# Patient Record
Sex: MALE | Race: WHITE | HISPANIC OR LATINO | URBAN - METROPOLITAN AREA
[De-identification: names, ages, dates, MRNs, and addresses within clinical notes are randomized per-mention and may not be internally consistent; named-entity substitution may affect disease eponyms.]

---

## 2018-01-18 ENCOUNTER — NEW PATIENT (OUTPATIENT)
Dept: URBAN - METROPOLITAN AREA CLINIC 85 | Facility: CLINIC | Age: 63
End: 2018-01-18
Payer: COMMERCIAL

## 2018-01-18 PROCEDURE — 92004 COMPRE OPH EXAM NEW PT 1/>: CPT | Performed by: OPHTHALMOLOGY

## 2018-01-18 RX ORDER — OFLOXACIN 3 MG/ML
0.3 % SOLUTION/ DROPS OPHTHALMIC
Qty: 1 | Refills: 1 | Status: ACTIVE
Start: 2018-01-18

## 2018-01-18 RX ORDER — PREDNISOLONE ACETATE 10 MG/ML
1 % SUSPENSION/ DROPS OPHTHALMIC
Qty: 1 | Refills: 1 | Status: ACTIVE
Start: 2018-01-18

## 2018-01-18 ASSESSMENT — KERATOMETRY
OD: 44.25
OS: 44.38

## 2018-01-18 ASSESSMENT — INTRAOCULAR PRESSURE
OD: 12
OS: 13

## 2018-01-18 ASSESSMENT — VISUAL ACUITY
OD: 20/50
OS: 20/50

## 2018-01-23 ENCOUNTER — Encounter (OUTPATIENT)
Dept: URBAN - METROPOLITAN AREA CLINIC 85 | Facility: CLINIC | Age: 63
End: 2018-01-23
Payer: COMMERCIAL

## 2018-01-23 DIAGNOSIS — H25.813 COMBINED FORMS OF AGE-RELATED CATARACT, BILATERAL: ICD-10-CM

## 2018-01-23 DIAGNOSIS — Z01.818 ENCOUNTER FOR OTHER PREPROCEDURAL EXAMINATION: Primary | ICD-10-CM

## 2018-01-23 PROCEDURE — 99213 OFFICE O/P EST LOW 20 MIN: CPT | Performed by: NURSE PRACTITIONER

## 2018-01-24 ENCOUNTER — SURGERY (OUTPATIENT)
Dept: URBAN - METROPOLITAN AREA SURGERY 55 | Facility: SURGERY | Age: 63
End: 2018-01-24
Payer: COMMERCIAL

## 2018-01-24 ENCOUNTER — POST OP (OUTPATIENT)
Dept: URBAN - METROPOLITAN AREA CLINIC 85 | Facility: CLINIC | Age: 63
End: 2018-01-24

## 2018-01-24 PROCEDURE — 99024 POSTOP FOLLOW-UP VISIT: CPT | Performed by: OPHTHALMOLOGY

## 2018-01-24 PROCEDURE — 66984 XCAPSL CTRC RMVL W/O ECP: CPT | Performed by: OPHTHALMOLOGY

## 2018-01-24 ASSESSMENT — INTRAOCULAR PRESSURE
OD: 19
OD: 19

## 2018-02-05 ENCOUNTER — POST OP (OUTPATIENT)
Dept: URBAN - METROPOLITAN AREA CLINIC 85 | Facility: CLINIC | Age: 63
End: 2018-02-05

## 2018-02-05 DIAGNOSIS — Z96.1 PRESENCE OF INTRAOCULAR LENS: Primary | ICD-10-CM

## 2018-02-05 PROCEDURE — 99024 POSTOP FOLLOW-UP VISIT: CPT | Performed by: OPTOMETRIST

## 2018-02-05 ASSESSMENT — VISUAL ACUITY
OS: 20/50
OD: 20/20

## 2018-02-05 ASSESSMENT — INTRAOCULAR PRESSURE
OD: 14
OS: 14

## 2018-02-07 ENCOUNTER — SURGERY (OUTPATIENT)
Dept: URBAN - METROPOLITAN AREA SURGERY 55 | Facility: SURGERY | Age: 63
End: 2018-02-07
Payer: COMMERCIAL

## 2018-02-07 ENCOUNTER — POST OP (OUTPATIENT)
Dept: URBAN - METROPOLITAN AREA CLINIC 85 | Facility: CLINIC | Age: 63
End: 2018-02-07

## 2018-02-07 PROCEDURE — 99024 POSTOP FOLLOW-UP VISIT: CPT | Performed by: OPHTHALMOLOGY

## 2018-02-07 PROCEDURE — 66984 XCAPSL CTRC RMVL W/O ECP: CPT | Performed by: OPHTHALMOLOGY

## 2018-02-07 ASSESSMENT — INTRAOCULAR PRESSURE
OS: 9
OS: 9

## 2018-03-01 ENCOUNTER — POST OP (OUTPATIENT)
Dept: URBAN - METROPOLITAN AREA CLINIC 85 | Facility: CLINIC | Age: 63
End: 2018-03-01

## 2018-03-01 PROCEDURE — 99024 POSTOP FOLLOW-UP VISIT: CPT | Performed by: OPTOMETRIST

## 2018-03-01 ASSESSMENT — INTRAOCULAR PRESSURE
OD: 14
OS: 14

## 2018-03-01 ASSESSMENT — VISUAL ACUITY
OS: 20/20
OD: 20/20

## 2022-01-10 ENCOUNTER — APPOINTMENT (RX ONLY)
Dept: URBAN - METROPOLITAN AREA CLINIC 67 | Facility: CLINIC | Age: 67
Setting detail: DERMATOLOGY
End: 2022-01-10

## 2022-01-10 DIAGNOSIS — I78.8 OTHER DISEASES OF CAPILLARIES: ICD-10-CM

## 2022-01-10 DIAGNOSIS — D49.2 NEOPLASM OF UNSPECIFIED BEHAVIOR OF BONE, SOFT TISSUE, AND SKIN: ICD-10-CM

## 2022-01-10 DIAGNOSIS — D69.2 OTHER NONTHROMBOCYTOPENIC PURPURA: ICD-10-CM

## 2022-01-10 PROCEDURE — ? DEFER

## 2022-01-10 PROCEDURE — ? COUNSELING

## 2022-01-10 PROCEDURE — 99203 OFFICE O/P NEW LOW 30 MIN: CPT

## 2022-01-10 ASSESSMENT — LOCATION ZONE DERM
LOCATION ZONE: HAND
LOCATION ZONE: HAND
LOCATION ZONE: ARM
LOCATION ZONE: FACE
LOCATION ZONE: ARM

## 2022-01-10 ASSESSMENT — LOCATION DETAILED DESCRIPTION DERM
LOCATION DETAILED: RIGHT PROXIMAL DORSAL FOREARM
LOCATION DETAILED: RIGHT PROXIMAL DORSAL FOREARM
LOCATION DETAILED: RIGHT MEDIAL MALAR CHEEK
LOCATION DETAILED: LEFT RADIAL DORSAL HAND
LOCATION DETAILED: LEFT RADIAL DORSAL HAND

## 2022-01-10 ASSESSMENT — LOCATION SIMPLE DESCRIPTION DERM
LOCATION SIMPLE: LEFT HAND
LOCATION SIMPLE: LEFT HAND
LOCATION SIMPLE: RIGHT FOREARM
LOCATION SIMPLE: RIGHT CHEEK
LOCATION SIMPLE: RIGHT FOREARM

## 2022-01-10 NOTE — PROCEDURE: MIPS QUALITY
Quality 402: Tobacco Use And Help With Quitting Among Adolescents: Patient screened for tobacco and never smoked
Quality 431: Preventive Care And Screening: Unhealthy Alcohol Use - Screening: Patient not identified as an unhealthy alcohol user when screened for unhealthy alcohol use using a systematic screening method
Detail Level: Detailed
Quality 110: Preventive Care And Screening: Influenza Immunization: Influenza immunization was not ordered or administered, reason not given
Quality 47: Advance Care Plan: Advance Care Planning discussed and documented; advance care plan or surrogate decision maker documented in the medical record.
Quality 111:Pneumonia Vaccination Status For Older Adults: Pneumococcal Vaccination not Administered or Previously Received, Reason not Otherwise Specified

## 2022-01-10 NOTE — PROCEDURE: DEFER
Introduction Text (Please End With A Colon): The following procedure was deferred: biopsy
Instructions (Optional): Left medial Malar cheek 0.7 x 0.5 r/o: Lentigo vs Lentigo Maligna vs AK
Procedure To Be Performed At Next Visit: Biopsy by shave method
Detail Level: Zone

## 2022-01-24 ENCOUNTER — APPOINTMENT (RX ONLY)
Dept: URBAN - METROPOLITAN AREA CLINIC 67 | Facility: CLINIC | Age: 67
Setting detail: DERMATOLOGY
End: 2022-01-24

## 2022-01-24 DIAGNOSIS — D49.2 NEOPLASM OF UNSPECIFIED BEHAVIOR OF BONE, SOFT TISSUE, AND SKIN: ICD-10-CM

## 2022-01-24 DIAGNOSIS — L28.0 LICHEN SIMPLEX CHRONICUS: ICD-10-CM | Status: INADEQUATELY CONTROLLED

## 2022-01-24 PROCEDURE — ? SHAVE REMOVAL

## 2022-01-24 PROCEDURE — ? COUNSELING

## 2022-01-24 PROCEDURE — 99214 OFFICE O/P EST MOD 30 MIN: CPT | Mod: 25

## 2022-01-24 PROCEDURE — 11311 SHAVE SKIN LESION 0.6-1.0 CM: CPT

## 2022-01-24 PROCEDURE — ? PRESCRIPTION

## 2022-01-24 RX ORDER — AMMONIUM LACTATE 120 MG/G
CREAM TOPICAL
Qty: 385 | Refills: 0 | Status: ERX | COMMUNITY
Start: 2022-01-24

## 2022-01-24 RX ADMIN — AMMONIUM LACTATE: 120 CREAM TOPICAL at 00:00

## 2022-01-24 ASSESSMENT — LOCATION SIMPLE DESCRIPTION DERM
LOCATION SIMPLE: RIGHT WRIST
LOCATION SIMPLE: LEFT CHEEK

## 2022-01-24 ASSESSMENT — LOCATION DETAILED DESCRIPTION DERM
LOCATION DETAILED: LEFT MEDIAL MALAR CHEEK
LOCATION DETAILED: RIGHT DORSAL WRIST

## 2022-01-24 ASSESSMENT — LOCATION ZONE DERM
LOCATION ZONE: ARM
LOCATION ZONE: FACE

## 2022-01-24 NOTE — PROCEDURE: SHAVE REMOVAL
Medical Necessity Information: It is in your best interest to select a reason for this procedure from the list below. All of these items fulfill various CMS LCD requirements except the new and changing color options.
Medical Necessity Clause: This procedure was medically necessary because the lesion that was treated was:
Lab: [id number] [facility]
Detail Level: Detailed
Was A Bandage Applied: Yes
Size Of Lesion In Cm (Required): 0.7
X Size Of Lesion In Cm (Optional): 0.5
Biopsy Method: Personna blade
Anesthesia Type: 1% lidocaine with epinephrine
Hemostasis: Drysol
Wound Care: Petrolatum
Path Notes (To The Dermatopathologist): Left medial malar cheek \\n0.7cm x 0.5cm\\nR/O: Lentigo vs. LM vs. AK
Render Path Notes In Note?: No
Consent was obtained from the patient. The risks and benefits to therapy were discussed in detail. Specifically, the risks of infection, scarring, bleeding, prolonged wound healing, incomplete removal, allergy to anesthesia, nerve injury and recurrence were addressed. Prior to the procedure, the treatment site was clearly identified and confirmed by the patient. All components of Universal Protocol/PAUSE Rule completed.
Post-Care Instructions: I reviewed with the patient in detail post-care instructions. Patient is to keep the biopsy site dry overnight, and then apply bacitracin twice daily until healed. Patient may apply hydrogen peroxide soaks to remove any crusting.
Notification Instructions: Patient will be notified of pathology results. However, patient instructed to call the office if not contacted within 2 weeks.
Billing Type: [employer]

## 2022-03-09 ENCOUNTER — APPOINTMENT (RX ONLY)
Dept: URBAN - METROPOLITAN AREA CLINIC 67 | Facility: CLINIC | Age: 67
Setting detail: DERMATOLOGY
End: 2022-03-09

## 2022-03-09 DIAGNOSIS — L28.0 LICHEN SIMPLEX CHRONICUS: ICD-10-CM

## 2022-03-09 DIAGNOSIS — L82.1 OTHER SEBORRHEIC KERATOSIS: ICD-10-CM

## 2022-03-09 DIAGNOSIS — L81.4 OTHER MELANIN HYPERPIGMENTATION: ICD-10-CM

## 2022-03-09 PROCEDURE — 99214 OFFICE O/P EST MOD 30 MIN: CPT

## 2022-03-09 PROCEDURE — ? TREATMENT REGIMEN

## 2022-03-09 PROCEDURE — ? COUNSELING

## 2022-03-09 PROCEDURE — ? PATHOLOGY DISCUSSION

## 2022-03-09 PROCEDURE — ? PRESCRIPTION

## 2022-03-09 RX ORDER — TRIAMCINOLONE ACETONIDE 1 MG/G
CREAM TOPICAL BID
Qty: 80 | Refills: 0 | Status: ERX | COMMUNITY
Start: 2022-03-09

## 2022-03-09 RX ADMIN — TRIAMCINOLONE ACETONIDE: 1 CREAM TOPICAL at 00:00

## 2022-03-09 ASSESSMENT — LOCATION ZONE DERM
LOCATION ZONE: ARM
LOCATION ZONE: HAND
LOCATION ZONE: FACE
LOCATION ZONE: FACE
LOCATION ZONE: ARM

## 2022-03-09 ASSESSMENT — LOCATION SIMPLE DESCRIPTION DERM
LOCATION SIMPLE: RIGHT WRIST
LOCATION SIMPLE: RIGHT HAND
LOCATION SIMPLE: LEFT CHEEK
LOCATION SIMPLE: LEFT ELBOW
LOCATION SIMPLE: LEFT ELBOW
LOCATION SIMPLE: LEFT CHEEK

## 2022-03-09 ASSESSMENT — LOCATION DETAILED DESCRIPTION DERM
LOCATION DETAILED: LEFT ANTECUBITAL SKIN
LOCATION DETAILED: LEFT ANTECUBITAL SKIN
LOCATION DETAILED: LEFT SUPERIOR CENTRAL MALAR CHEEK
LOCATION DETAILED: RIGHT RADIAL DORSAL HAND
LOCATION DETAILED: RIGHT DORSAL WRIST
LOCATION DETAILED: RIGHT ULNAR DORSAL HAND
LOCATION DETAILED: LEFT CENTRAL MALAR CHEEK

## 2025-05-22 ENCOUNTER — APPOINTMENT (OUTPATIENT)
Dept: URBAN - METROPOLITAN AREA CLINIC 67 | Facility: CLINIC | Age: 70
Setting detail: DERMATOLOGY
End: 2025-05-22

## 2025-05-22 DIAGNOSIS — D49.2 NEOPLASM OF UNSPECIFIED BEHAVIOR OF BONE, SOFT TISSUE, AND SKIN: ICD-10-CM

## 2025-05-22 DIAGNOSIS — D18.0 HEMANGIOMA: ICD-10-CM

## 2025-05-22 DIAGNOSIS — L90.5 SCAR CONDITIONS AND FIBROSIS OF SKIN: ICD-10-CM

## 2025-05-22 PROBLEM — D18.01 HEMANGIOMA OF SKIN AND SUBCUTANEOUS TISSUE: Status: ACTIVE | Noted: 2025-05-22

## 2025-05-22 PROCEDURE — ? DEFER

## 2025-05-22 PROCEDURE — 99213 OFFICE O/P EST LOW 20 MIN: CPT

## 2025-05-22 PROCEDURE — ? COUNSELING

## 2025-05-22 ASSESSMENT — LOCATION DETAILED DESCRIPTION DERM
LOCATION DETAILED: LEFT MEDIAL SUPERIOR CHEST
LOCATION DETAILED: RIGHT MEDIAL INFERIOR CHEST
LOCATION DETAILED: LEFT MEDIAL INFERIOR CHEST
LOCATION DETAILED: LEFT INFERIOR HELIX
LOCATION DETAILED: RIGHT PROXIMAL DORSAL FOREARM

## 2025-05-22 ASSESSMENT — LOCATION ZONE DERM
LOCATION ZONE: ARM
LOCATION ZONE: EAR
LOCATION ZONE: TRUNK

## 2025-05-22 ASSESSMENT — LOCATION SIMPLE DESCRIPTION DERM
LOCATION SIMPLE: RIGHT FOREARM
LOCATION SIMPLE: CHEST
LOCATION SIMPLE: LEFT EAR

## 2025-05-22 NOTE — PROCEDURE: MIPS QUALITY
Quality 226: Preventive Care And Screening: Tobacco Use: Screening And Cessation Intervention: Patient screened for tobacco use and is an ex/non-smoker
Detail Level: Detailed
Quality 130: Documentation Of Current Medications In The Medical Record: Current Medications Documented
Quality 431: Preventive Care And Screening: Unhealthy Alcohol Use - Screening: Patient not identified as an unhealthy alcohol user when screened for unhealthy alcohol use using a systematic screening method
Quality 47: Advance Care Plan: Advance care planning not documented, reason not otherwise specified.
Quality 486: Dermatitis - Improvement In Patient-Reported Itch Severity: Itch severity assessment score is reduced by 3 or more points from the initial (index) assessment score to the follow-up visit score

## 2025-05-22 NOTE — PROCEDURE: DEFER
Detail Level: Detailed
X Size Of Lesion In Cm (Optional): 0
Introduction Text (Please End With A Colon): The following procedure was deferred: biopsy
Procedure To Be Performed At Next Visit: Biopsy by shave method
Instructions (Optional): A. 1.2cm BCC vs SCC\\nB. 0.7cm SL vs Mm\\nC. 0.7cm BCC vs SCC

## 2025-06-05 ENCOUNTER — APPOINTMENT (OUTPATIENT)
Dept: URBAN - METROPOLITAN AREA CLINIC 67 | Facility: CLINIC | Age: 70
Setting detail: DERMATOLOGY
End: 2025-06-05

## 2025-06-05 DIAGNOSIS — D49.2 NEOPLASM OF UNSPECIFIED BEHAVIOR OF BONE, SOFT TISSUE, AND SKIN: ICD-10-CM

## 2025-06-05 PROCEDURE — ? BIOPSY BY SHAVE METHOD

## 2025-06-05 PROCEDURE — ? COUNSELING

## 2025-06-05 ASSESSMENT — LOCATION ZONE DERM
LOCATION ZONE: TRUNK
LOCATION ZONE: ARM
LOCATION ZONE: EAR

## 2025-06-05 ASSESSMENT — LOCATION DETAILED DESCRIPTION DERM
LOCATION DETAILED: RIGHT LATERAL SUPERIOR CHEST
LOCATION DETAILED: RIGHT VENTRAL PROXIMAL FOREARM
LOCATION DETAILED: LEFT INFERIOR HELIX

## 2025-06-05 ASSESSMENT — LOCATION SIMPLE DESCRIPTION DERM
LOCATION SIMPLE: RIGHT FOREARM
LOCATION SIMPLE: CHEST
LOCATION SIMPLE: LEFT EAR

## 2025-06-05 NOTE — PROCEDURE: BIOPSY BY SHAVE METHOD
Detail Level: Detailed
Depth Of Biopsy: dermis
Was A Bandage Applied: Yes
Size Of Lesion In Cm: 1
X Size Of Lesion In Cm: 0
Biopsy Type: H and E
Biopsy Method: Dermablade
Anesthesia Type: 1% lidocaine with epinephrine
Anesthesia Volume In Cc: 0.5
Hemostasis: Electrocautery
Wound Care: Bacitracin
Dressing: bandage
Destruction After The Procedure: No
Type Of Destruction Used: Curettage
Curettage Text: The wound bed was treated with curettage after the biopsy was performed.
Cryotherapy Text: The wound bed was treated with cryotherapy after the biopsy was performed.
Electrodesiccation Text: The wound bed was treated with electrodesiccation after the biopsy was performed.
Electrodesiccation And Curettage Text: The wound bed was treated with electrodesiccation and curettage after the biopsy was performed.
Silver Nitrate Text: The wound bed was treated with silver nitrate after the biopsy was performed.
Lab: [id number]
Path Notes (To The Dermatopathologist): Size: 1.0cm\\nR/O: BCC vs SCC
Medical Necessity Information: It is in your best interest to select a reason for this procedure from the list below. All of these items fulfill various CMS LCD requirements except the new and changing color options.
Consent: Written consent was obtained and risks were reviewed including but not limited to scarring, infection, bleeding, scabbing, incomplete removal, nerve damage and allergy to anesthesia.
Post-Care Instructions: I reviewed with the patient in detail post-care instructions. Patient is to keep the biopsy site dry overnight, and then apply bacitracin twice daily until healed. Patient may apply hydrogen peroxide soaks to remove any crusting.
Notification Instructions: Patient will be notified of biopsy results. However, patient instructed to call the office if not contacted within 2 weeks.
Billing Type: Third-Party Bill
Information: Selecting Yes will display possible errors in your note based on the variables you have selected. This validation is only offered as a suggestion for you. PLEASE NOTE THAT THE VALIDATION TEXT WILL BE REMOVED WHEN YOU FINALIZE YOUR NOTE. IF YOU WANT TO FAX A PRELIMINARY NOTE YOU WILL NEED TO TOGGLE THIS TO 'NO' IF YOU DO NOT WANT IT IN YOUR FAXED NOTE.
Size Of Lesion In Cm: 0.7
Path Notes (To The Dermatopathologist): Size: 0.7cm\\nR/O: BCC vs SCC